# Patient Record
Sex: FEMALE | Race: WHITE
[De-identification: names, ages, dates, MRNs, and addresses within clinical notes are randomized per-mention and may not be internally consistent; named-entity substitution may affect disease eponyms.]

---

## 2018-09-10 ENCOUNTER — HOSPITAL ENCOUNTER (EMERGENCY)
Dept: HOSPITAL 92 - ERS | Age: 58
Discharge: HOME | End: 2018-09-10
Payer: SELF-PAY

## 2018-09-10 DIAGNOSIS — M25.511: Primary | ICD-10-CM

## 2018-09-10 DIAGNOSIS — F17.210: ICD-10-CM

## 2018-09-10 DIAGNOSIS — F31.9: ICD-10-CM

## 2018-09-10 NOTE — RAD
RIGHT SHOULDER 3 VIEWS:

 

HISTORY: 

Pain.  Injury.

 

COMPARISON: 

None.

 

FINDINGS: 

There are degenerative changes of the AC joint space.  Glenohumeral joint space is preserved.  No fra
cture or dislocation.  Sclerosis of the greater tuberosity suggesting degenerative change.  Visualize
d right ribs are intact.

 

IMPRESSION: 

No fracture or dislocation.

 

POS: [facility]

## 2019-08-19 ENCOUNTER — HOSPITAL ENCOUNTER (INPATIENT)
Dept: HOSPITAL 92 - ERS | Age: 59
LOS: 2 days | Discharge: ANOTHER HOSPITAL | DRG: 917 | End: 2019-08-21
Attending: HOSPITALIST | Admitting: HOSPITALIST
Payer: SELF-PAY

## 2019-08-19 VITALS — DIASTOLIC BLOOD PRESSURE: 97 MMHG | SYSTOLIC BLOOD PRESSURE: 144 MMHG

## 2019-08-19 VITALS — BODY MASS INDEX: 28.9 KG/M2

## 2019-08-19 DIAGNOSIS — T51.0X2A: ICD-10-CM

## 2019-08-19 DIAGNOSIS — T40.5X2A: ICD-10-CM

## 2019-08-19 DIAGNOSIS — F17.208: ICD-10-CM

## 2019-08-19 DIAGNOSIS — F31.10: ICD-10-CM

## 2019-08-19 DIAGNOSIS — G92: ICD-10-CM

## 2019-08-19 DIAGNOSIS — J96.90: ICD-10-CM

## 2019-08-19 DIAGNOSIS — T39.1X2A: Primary | ICD-10-CM

## 2019-08-19 DIAGNOSIS — J45.909: ICD-10-CM

## 2019-08-19 DIAGNOSIS — T45.0X2A: ICD-10-CM

## 2019-08-19 LAB
ALBUMIN SERPL BCG-MCNC: 5.2 G/DL (ref 3.5–5)
ALP SERPL-CCNC: 96 U/L (ref 40–150)
ALT SERPL W P-5'-P-CCNC: 14 U/L (ref 8–55)
ANALYZER IN CARDIO: (no result)
ANALYZER IN CARDIO: (no result)
ANION GAP SERPL CALC-SCNC: 19 MMOL/L (ref 10–20)
APAP SERPL-MCNC: 105 MCG/ML (ref 10–30)
APAP SERPL-MCNC: 178 MCG/ML (ref 10–30)
AST SERPL-CCNC: 23 U/L (ref 5–34)
BASE EXCESS STD BLDA CALC-SCNC: -6.4 MEQ/L
BASE EXCESS STD BLDA CALC-SCNC: -7.6 MEQ/L
BILIRUB SERPL-MCNC: 0.5 MG/DL (ref 0.2–1.2)
BUN SERPL-MCNC: 8 MG/DL (ref 9.8–20.1)
CA-I BLDA-SCNC: 1.1 MMOL/L (ref 1.12–1.3)
CA-I BLDA-SCNC: 1.12 MMOL/L (ref 1.12–1.3)
CALCIUM SERPL-MCNC: 9.8 MG/DL (ref 7.8–10.44)
CHLORIDE SERPL-SCNC: 106 MMOL/L (ref 98–107)
CO2 SERPL-SCNC: 21 MMOL/L (ref 22–29)
CREAT CL PREDICTED SERPL C-G-VRATE: 0 ML/MIN (ref 70–130)
DRUG SCREEN CUTOFF: (no result)
GLOBULIN SER CALC-MCNC: 3.2 G/DL (ref 2.4–3.5)
GLUCOSE SERPL-MCNC: 66 MG/DL (ref 70–105)
HCO3 BLDA-SCNC: 17.9 MEQ/L (ref 22–28)
HCO3 BLDA-SCNC: 18.4 MEQ/L (ref 22–28)
HCT VFR BLDA CALC: 45 % (ref 36–47)
HCT VFR BLDA CALC: 47 % (ref 36–47)
HGB BLD-MCNC: 18.5 G/DL (ref 12–16)
HGB BLDA-MCNC: 15.3 G/DL (ref 12–16)
HGB BLDA-MCNC: 16.1 G/DL (ref 12–16)
MCH RBC QN AUTO: 32.3 PG (ref 27–31)
MCV RBC AUTO: 91.3 FL (ref 78–98)
MDIFF COMPLETE?: YES
MEDTOX CONTROL LINE VALID?: (no result)
MEDTOX READER #: (no result)
O2 A-A PPRESDIFF RESPIRATORY: 201.95 MM[HG] (ref 0–20)
PCO2 BLDA: 35 MMHG (ref 35–45)
PCO2 BLDA: 36.8 MMHG (ref 35–45)
PH BLDA: 7.31 [PH] (ref 7.35–7.45)
PH BLDA: 7.34 [PH] (ref 7.35–7.45)
PLATELET # BLD AUTO: 245 THOU/UL (ref 130–400)
PO2 BLDA: 123.1 MMHG (ref 80–100)
PO2 BLDA: 182.1 MMHG (ref 80–100)
POTASSIUM BLD-SCNC: 3.07 MMOL/L (ref 3.7–5.3)
POTASSIUM BLD-SCNC: 3.17 MMOL/L (ref 3.7–5.3)
POTASSIUM SERPL-SCNC: 3.5 MMOL/L (ref 3.5–5.1)
PREGS CONTROL BACKGROUND?: (no result)
PREGS CONTROL BAR APPEAR?: YES
PROT UR STRIP.AUTO-MCNC: 30 MG/DL
RBC # BLD AUTO: 5.73 MILL/UL (ref 4.2–5.4)
SALICYLATES SERPL-MCNC: (no result) MG/DL (ref 15–30)
SODIUM SERPL-SCNC: 142 MMOL/L (ref 136–145)
SPECIMEN DRAWN FROM PATIENT: (no result)
SPECIMEN DRAWN FROM PATIENT: (no result)
WBC # BLD AUTO: 7.8 THOU/UL (ref 4.8–10.8)
WBC UR QL AUTO: (no result) HPF (ref 0–3)

## 2019-08-19 PROCEDURE — 80307 DRUG TEST PRSMV CHEM ANLYZR: CPT

## 2019-08-19 PROCEDURE — 93005 ELECTROCARDIOGRAM TRACING: CPT

## 2019-08-19 PROCEDURE — 36415 COLL VENOUS BLD VENIPUNCTURE: CPT

## 2019-08-19 PROCEDURE — 5A1935Z RESPIRATORY VENTILATION, LESS THAN 24 CONSECUTIVE HOURS: ICD-10-PCS | Performed by: HOSPITALIST

## 2019-08-19 PROCEDURE — 93010 ELECTROCARDIOGRAM REPORT: CPT

## 2019-08-19 PROCEDURE — 81015 MICROSCOPIC EXAM OF URINE: CPT

## 2019-08-19 PROCEDURE — 0BH17EZ INSERTION OF ENDOTRACHEAL AIRWAY INTO TRACHEA, VIA NATURAL OR ARTIFICIAL OPENING: ICD-10-PCS | Performed by: HOSPITALIST

## 2019-08-19 PROCEDURE — 80053 COMPREHEN METABOLIC PANEL: CPT

## 2019-08-19 PROCEDURE — 85025 COMPLETE CBC W/AUTO DIFF WBC: CPT

## 2019-08-19 PROCEDURE — 94640 AIRWAY INHALATION TREATMENT: CPT

## 2019-08-19 PROCEDURE — 84484 ASSAY OF TROPONIN QUANT: CPT

## 2019-08-19 PROCEDURE — S0028 INJECTION, FAMOTIDINE, 20 MG: HCPCS

## 2019-08-19 PROCEDURE — 80306 DRUG TEST PRSMV INSTRMNT: CPT

## 2019-08-19 PROCEDURE — 80048 BASIC METABOLIC PNL TOTAL CA: CPT

## 2019-08-19 PROCEDURE — 84703 CHORIONIC GONADOTROPIN ASSAY: CPT

## 2019-08-19 PROCEDURE — 36416 COLLJ CAPILLARY BLOOD SPEC: CPT

## 2019-08-19 PROCEDURE — 94002 VENT MGMT INPAT INIT DAY: CPT

## 2019-08-19 PROCEDURE — 82805 BLOOD GASES W/O2 SATURATION: CPT

## 2019-08-19 PROCEDURE — 71045 X-RAY EXAM CHEST 1 VIEW: CPT

## 2019-08-19 PROCEDURE — 81003 URINALYSIS AUTO W/O SCOPE: CPT

## 2019-08-19 RX ADMIN — FAMOTIDINE SCH MG: 10 INJECTION, SOLUTION INTRAVENOUS at 20:30

## 2019-08-19 RX ADMIN — FAMOTIDINE SCH MG: 10 INJECTION, SOLUTION INTRAVENOUS at 09:56

## 2019-08-19 RX ADMIN — Medication SCH ML: at 20:37

## 2019-08-19 NOTE — CON
DATE OF CONSULTATION:  



HISTORY OF PRESENT ILLNESS:  She is a 59-year-old female, who apparently was

depressed and yesterday took unknown quantity of Tylenol and Benadryl.  She was

intubated in the ER to protect her airways.  Her Tylenol level was 105.  She was

started on Mucomyst. 



She has done this several times before.  In fact, she was seen by GI 2 years ago for

a similar problem. 



Apparently, she is referred to [facility]. 



She is sedated.  I have stopped the sedation and she is more responsive and moves

all 4 extremities.  May probably somewhere down the line, try to wean and extubate

her. 



PAST MEDICAL HISTORY:  Pertinent for bipolar disorder, previous suicide attempts.



PAST SURGICAL HISTORY:  Previous surgeries including multiple orthopedic surgeries,

left knee right ankle, tonsils, and tubal ligation. 



SOCIAL HISTORY:  Apparently, previous drug abuser, a pack-a-day smoker.  Alcohol was

none. 



ALLERGIES:  PENICILLIN, CODEINE.



REVIEW OF SYSTEMS:  Difficult to obtain.



PHYSICAL EXAMINATION:

VITAL SIGNS:  Pulse 76, blood pressure 109/60, saturations 100%, respiratory rate

15. 

CHEST:  Decreased breath sounds.  No wheezing. 

CARDIAC:  Normal S1 and S2.  No gallops. 

ABDOMEN:  No masses. 

NEUROLOGICALLY:  Awake, responsive after sedation was withheld.



LABORATORY DATA:  PO2 is 123, pCO2 is 36 __________ and glucose 161.  White count

7000, H and H __________, platelet count is normal. 



ASSESSMENT:  

1. Status post overdose on Tylenol and Benadryl on IV Mucomyst protocol.

2. Bipolar disorder.



PLAN:  We will try and wean and extubate.  Supportive care, PT.  She needs ongoing

counseling.  She probably needs psych inpatient care. 



We will follow.  45-minute critical time.







Job ID:  [id number]

## 2019-08-19 NOTE — RAD
CHEST 1 VIEW:

 

Date:  08/19/19 

 

HISTORY:  

Difficulty breathing. 

 

FINDINGS:

Endotracheal tube and NG tubes have been placed in satisfactory location. Heart size is within normal
 limits. Patchy bilateral increased markings in the perihilar regions, evidence for some bilateral va
scular congestion. No confluent pneumonia. 

 

IMPRESSION: 

Patchy perihilar parenchymal changes, nonspecific, probably related to vascular congestion. Continued
 short-term follow-up. Tubes in place. 

 

 

POS: [facility]

## 2019-08-19 NOTE — PDOC.EVN
Event Note





- Event Note


Event Note: 





Record reviewed.  Patient examined.  She is reporting episodes of anxiety.  

Otherwise she feels ok.  Exam is generally unremarkable.  BP is running high 

since she went off the sedation.  PRN's ordered.  Will avoid BZD's. for now.  

Will need to see [facility] before discharge.

## 2019-08-20 LAB
ALBUMIN SERPL BCG-MCNC: 3.4 G/DL (ref 3.5–5)
ALP SERPL-CCNC: 70 U/L (ref 40–150)
ALT SERPL W P-5'-P-CCNC: (no result) U/L (ref 8–55)
ANION GAP SERPL CALC-SCNC: 10 MMOL/L (ref 10–20)
ANION GAP SERPL CALC-SCNC: 16 MMOL/L (ref 10–20)
APAP SERPL-MCNC: (no result) MCG/ML (ref 10–30)
AST SERPL-CCNC: 15 U/L (ref 5–34)
BASOPHILS # BLD AUTO: 0 THOU/UL (ref 0–0.2)
BASOPHILS NFR BLD AUTO: 0.3 % (ref 0–1)
BILIRUB SERPL-MCNC: 0.3 MG/DL (ref 0.2–1.2)
BUN SERPL-MCNC: 5 MG/DL (ref 9.8–20.1)
BUN SERPL-MCNC: 7 MG/DL (ref 9.8–20.1)
CALCIUM SERPL-MCNC: 8.2 MG/DL (ref 7.8–10.44)
CALCIUM SERPL-MCNC: 8.3 MG/DL (ref 7.8–10.44)
CHLORIDE SERPL-SCNC: 111 MMOL/L (ref 98–107)
CHLORIDE SERPL-SCNC: 111 MMOL/L (ref 98–107)
CO2 SERPL-SCNC: 16 MMOL/L (ref 22–29)
CO2 SERPL-SCNC: 21 MMOL/L (ref 22–29)
CREAT CL PREDICTED SERPL C-G-VRATE: 103 ML/MIN (ref 70–130)
CREAT CL PREDICTED SERPL C-G-VRATE: 113 ML/MIN (ref 70–130)
EOSINOPHIL # BLD AUTO: 0.1 THOU/UL (ref 0–0.7)
EOSINOPHIL NFR BLD AUTO: 1 % (ref 0–10)
GLOBULIN SER CALC-MCNC: 2 G/DL (ref 2.4–3.5)
GLUCOSE SERPL-MCNC: 112 MG/DL (ref 70–105)
GLUCOSE SERPL-MCNC: 155 MG/DL (ref 70–105)
HGB BLD-MCNC: 14.2 G/DL (ref 12–16)
LYMPHOCYTES # BLD: 1.6 THOU/UL (ref 1.2–3.4)
LYMPHOCYTES NFR BLD AUTO: 19 % (ref 21–51)
MCH RBC QN AUTO: 30.8 PG (ref 27–31)
MCV RBC AUTO: 90.2 FL (ref 78–98)
MONOCYTES # BLD AUTO: 0.5 THOU/UL (ref 0.11–0.59)
MONOCYTES NFR BLD AUTO: 6.3 % (ref 0–10)
NEUTROPHILS # BLD AUTO: 6.3 THOU/UL (ref 1.4–6.5)
NEUTROPHILS NFR BLD AUTO: 73.5 % (ref 42–75)
PLATELET # BLD AUTO: 176 THOU/UL (ref 130–400)
POTASSIUM SERPL-SCNC: 3.2 MMOL/L (ref 3.5–5.1)
POTASSIUM SERPL-SCNC: 3.7 MMOL/L (ref 3.5–5.1)
RBC # BLD AUTO: 4.6 MILL/UL (ref 4.2–5.4)
SODIUM SERPL-SCNC: 139 MMOL/L (ref 136–145)
SODIUM SERPL-SCNC: 139 MMOL/L (ref 136–145)
WBC # BLD AUTO: 8.6 THOU/UL (ref 4.8–10.8)

## 2019-08-20 RX ADMIN — Medication SCH ML: at 21:13

## 2019-08-20 RX ADMIN — FAMOTIDINE SCH MG: 10 INJECTION, SOLUTION INTRAVENOUS at 09:14

## 2019-08-20 RX ADMIN — Medication SCH ML: at 09:14

## 2019-08-20 RX ADMIN — FAMOTIDINE SCH MG: 10 INJECTION, SOLUTION INTRAVENOUS at 21:13

## 2019-08-20 NOTE — PRG
DATE OF SERVICE:  08/20/2019



SUBJECTIVE:  This morning, she is awake, alert, responsive, no distress.  No 
nausea.

 Awaiting [facility]. 



OBJECTIVE:  VITAL SIGNS:  Saturations are 94% on room air, temperature 98, blood

pressure 130\73, respiratory rate 18. 

CHEST:  No wheezing, crackles. 

CARDIAC:  Normal S1, S2.  No gallops. 

ABDOMEN:  No masses.



LABORATORY DATA:  Tylenol level is less than 6.



IMPRESSION:  

1. Overdose on Tylenol, anticholinergics, improved.

2. Respiratory failure, resolved.

3. Bipolar depression.



PLAN:  [facility] will see her today.  Further recommendation thereafter.







Job ID:  [id number]



MTDD

## 2019-08-20 NOTE — PDOC.EVN
Event Note





- Event Note


Event Note: 





Seen/examined in ICU.  Await MR input, note to follow.

## 2019-08-20 NOTE — PDOC.HOSPP
- Subjective


Encounter Date: 08/20/19


Encounter Time: 16:00


Subjective: 





Late entry note, patient seen early this afternoon.  Medically stabilizing, 

recommend MR evaluation.  She has a history of care with [facility] in the past 

with Dr. Dyer.  Did well with trazodone for sleep in the past.   Reports 

Geodon caused "cloudy thinking" so she discontinued it.  Denies suicidal 

ideation.  States she has been under a lot of stress but in general life has 

been manageable.  She has [facility] plans and looks forward to relocating to 

[place].  Endorses partying with friends, does not recall taking medications.





- Objective


Vital Signs & Weight: 


 Vital Signs (12 hours)











  Temp Pulse Ox


 


 08/20/19 14:57  98.6 F 


 


 08/20/19 10:29  98.2 F 


 


 08/20/19 08:00   95


 


 08/20/19 07:27  98.3 F 








 Weight











Weight                         158 lb 1.143 oz











 Most Recent Monitor Data











Heart Rate from ECG            102


 


NIBP                           128/92


 


NIBP BP-Mean                   104


 


Respiration from ECG           27


 


SpO2                           93














I&O: 


 











 08/19/19 08/20/19 08/21/19





 06:59 06:59 06:59


 


Intake Total 357.7 4996 


 


Output Total 200 3400 


 


Balance 157.7 1596 











Result Diagrams: 


 08/20/19 05:20





 08/20/19 05:20





ROS





- Medication


Medications: 


Active Medications











Generic Name Dose Route Start Last Admin





  Trade Name Freq  PRN Reason Stop Dose Admin


 


Enoxaparin Sodium  40 mg  08/19/19 09:00  08/20/19 09:13





  Lovenox  SC   40 mg





  0900 ISELA   Administration





     





     





     





     


 


Famotidine  20 mg  08/19/19 09:00  08/20/19 09:14





  Pepcid  SLOW IVP   20 mg





  BID ISELA   Administration





     





     





     





     


 


Sodium Chloride  1,000 mls @ 125 mls/hr  08/19/19 04:00  08/20/19 13:29





  Normal Saline 0.9%  IV   1,000 mls





  .Q8H ISELA   Administration





     





     





     





     


 


Ondansetron HCl  4 mg  08/19/19 03:46  08/19/19 09:54





  Zofran  IVP   4 mg





  Q6H PRN   Administration





  Nausea/Vomiting   





     





     





     


 


Sodium Chloride  10 ml  08/19/19 21:00  08/20/19 09:14





  Flush - Normal Saline  IVF   10 ml





  Q12HR ISELA   Administration





     





     





     





     














- Exam


NAD


General - other findings: Slightly anxious


ENT: moist mucosa


Neck: supple


Heart: RRR


Respiratory: CTAB, wheezes


Respiratory - other findings: occasional wheezes


Gastrointestinal: soft, non-tender


Extremities: no edema


Skin: no rashes


Neurological: no focal deficits


Psychiatric: A&O x 3





Hosp A/P


(1) Tobacco dependence


Code(s): F17.200 - NICOTINE DEPENDENCE, UNSPECIFIED, UNCOMPLICATED   Status: 

Acute   





(2) Bipolar 1 disorder


Code(s): F31.9 - BIPOLAR DISORDER, UNSPECIFIED   Status: Acute   





(3) Overdose on Tylenol


Code(s): T39.1X1A - POISONING BY 4-AMINOPHENOL DERIVATIVES, ACCIDENTAL, INIT   

Status: Acute   





- Plan


Medically cleared for discharge; tylenol level noted.  Encourage tobacco 

cessation.  Await [facility] evaluation.

## 2019-08-21 VITALS — TEMPERATURE: 98.6 F

## 2019-08-21 LAB — APAP SERPL-MCNC: (no result) MCG/ML (ref 10–30)

## 2019-08-21 RX ADMIN — FAMOTIDINE SCH MG: 10 INJECTION, SOLUTION INTRAVENOUS at 09:15

## 2019-08-21 RX ADMIN — Medication SCH ML: at 09:15

## 2019-08-21 NOTE — DIS
DATE OF ADMISSION:  08/19/2019



DATE OF DISCHARGE:  08/21/2019



PRIMARY CARE PHYSICIAN:  Stephanie Ibarra.



DISCHARGE DISPOSITION:  [facility].



CHIEF COMPLAINT:  Suicide attempt with ingestion of Benadryl and Tylenol.



PRINCIPAL DIAGNOSIS ON ADMISSION:  acute metabolic encephalopathy secondary to

polysubstance overdose, including alcohol, acetaminophen, cocaine, and Benadryl. 



DISCHARGE DIAGNOSES:  

1. Acute encephalopathy secondary to polysubstance overdose with alcohol,

acetaminophen, cocaine, and Benadryl. 

2. Decompensated bipolar disorder, with manic phase.

3. Tobacco dependence.

4. History of reactive airway disease secondary to tobacco use.



HOSPITAL COURSE:  Ms. Rivera is a 59-year-old female with a history of bipolar

disorder, previously on medications and under the care of [facility].  She has in recent

years been off medications, as she did not care for the side effects of several of

her medicines.  She presented to [facility], after ingestion of multiple

tablets of acetaminophen as well as multiple tablets of Benadryl and Tylenol, as

well as alcohol.  Additionally, urine drug screen notable for cocaine positivity.

The patient was initially intubated for airway protection, placed in the intensive

care unit. 



Poison Control consulted on admission, charcoal administration performed, Tylenol

level monitored/treated according to protocol.  The following morning, the patient

extubated, with interval improvement medically.  Subsequently, cleared medically for

discharge, [facility] consultation undertaken on 08/20/2019. 



[facility] findings notable for decompensated arthur, recommendation for inpatient

hospitalization.  Unable to assess suicide scale, due to tangential speech and

difficulty of the patient clearly answering questions.  Referral subsequently made

to [facility] with anticipated discharge to the same facility

this morning. 



Medically, overnight, the patient has remained stable.  Hemodynamically, stable.

Tolerating a diet.  History of tobacco dependence, uses ProAir HFA inhaler on

occasion at home.  I have added this to her medications for discharge as well.

Laboratory assessment reviewed, reassuring, with Tylenol level less than 6 achieved

on 08/20/2019. 



PHYSICAL EXAMINATION AT DISCHARGE:  LUNGS:  Clear to auscultation bilaterally, and

occasional scattered wheeze, which clears with coughing. 

HEART:  Regular, rate, rhythm. 

ABDOMEN:  Soft, nontender, and nondistended. 

NEUROLOGIC:  Affect is reactive, agitated, as her preference is not to proceed with

involuntary commitment. 



MEDICATIONS AT DISCHARGE:  Albuterol HFA 2 puffs q.4 hours hourly p.r.n. shortness

of breath.  Of note, the patient indicates she has previously tolerated trazodone

well at night to promote sleep. 



DIET:  Regular.



ACTIVITY:  As tolerated. 



Discontinue IV fluids, discontinue IV, transition when bed available.



TIME SPENT:  Time spent on discharge planning and the patient's care today, 40

minutes. 







Job ID:  [id number]

## 2019-08-21 NOTE — PRG
DATE OF SERVICE:  08/21/2019



SUBJECTIVE:  This morning, she is awake, alert, and responsive, in no distress.



OBJECTIVE:  VITAL SIGNS:  Saturations are 98% on room air, respiratory rate 16,

temperature 96, blood pressure 120/80. 

CHEST:  No wheezing, crackles. 

CARDIAC:  Normal S1, S2.  No gallops. 

ABDOMEN:  No masses.



IMPRESSION:  

1. Status post overdose Tylenol and Benadryl.

2. History of depression.



PLAN:  Pulmonary wise, she is stable enough to be discharged home.  As per [facility],

Pulmonary will follow at a distance. 







Job ID:  [id number]

## 2019-08-24 NOTE — EKG
Test Reason : 

Blood Pressure : ***/*** mmHG

Vent. Rate : 086 BPM     Atrial Rate : 086 BPM

   P-R Int : 178 ms          QRS Dur : 094 ms

    QT Int : 472 ms       P-R-T Axes : 056 066 064 degrees

   QTc Int : 564 ms

 

Normal sinus rhythm

Possible Left atrial enlargement

Septal infarct , age undetermined

Prolonged QT

Abnormal ECG

 

Confirmed by SERGE JASSO DO ([id number]),  CATIA BERMUDEZ ([id number]) on 8/24/2019 1:33:24 PM

 

Referred By:             Confirmed By:SERGE JASSO DO